# Patient Record
Sex: FEMALE | Race: BLACK OR AFRICAN AMERICAN | NOT HISPANIC OR LATINO | Employment: UNEMPLOYED | URBAN - METROPOLITAN AREA
[De-identification: names, ages, dates, MRNs, and addresses within clinical notes are randomized per-mention and may not be internally consistent; named-entity substitution may affect disease eponyms.]

---

## 2023-04-26 ENCOUNTER — HOSPITAL ENCOUNTER (EMERGENCY)
Facility: HOSPITAL | Age: 23
Discharge: HOME | End: 2023-04-26
Attending: EMERGENCY MEDICINE | Admitting: EMERGENCY MEDICINE

## 2023-04-26 VITALS
SYSTOLIC BLOOD PRESSURE: 158 MMHG | TEMPERATURE: 98.4 F | RESPIRATION RATE: 20 BRPM | DIASTOLIC BLOOD PRESSURE: 96 MMHG | HEART RATE: 92 BPM | OXYGEN SATURATION: 96 %

## 2023-04-26 DIAGNOSIS — J20.9 ACUTE BRONCHITIS, UNSPECIFIED ORGANISM: Primary | ICD-10-CM

## 2023-04-26 DIAGNOSIS — R09.81 CONGESTION OF NASAL SINUS: ICD-10-CM

## 2023-04-26 LAB
EXT PREGNANCY TEST URINE: NEGATIVE
EXT. CONTROL: NORMAL

## 2023-04-26 RX ORDER — OXYMETAZOLINE HYDROCHLORIDE 0.05 G/100ML
2 SPRAY NASAL ONCE
Status: COMPLETED | OUTPATIENT
Start: 2023-04-26 | End: 2023-04-26

## 2023-04-26 RX ORDER — ALBUTEROL SULFATE 2.5 MG/3ML
5 SOLUTION RESPIRATORY (INHALATION) ONCE
Status: COMPLETED | OUTPATIENT
Start: 2023-04-26 | End: 2023-04-26

## 2023-04-26 RX ORDER — ALBUTEROL SULFATE 90 UG/1
2 AEROSOL, METERED RESPIRATORY (INHALATION) EVERY 6 HOURS PRN
Qty: 18 G | Refills: 0 | Status: SHIPPED | OUTPATIENT
Start: 2023-04-26

## 2023-04-26 RX ORDER — GUAIFENESIN 600 MG/1
600 TABLET, EXTENDED RELEASE ORAL ONCE
Status: COMPLETED | OUTPATIENT
Start: 2023-04-26 | End: 2023-04-26

## 2023-04-26 RX ORDER — GUAIFENESIN 600 MG/1
1200 TABLET, EXTENDED RELEASE ORAL EVERY 12 HOURS SCHEDULED
Qty: 24 TABLET | Refills: 0 | Status: SHIPPED | OUTPATIENT
Start: 2023-04-26 | End: 2023-05-02

## 2023-04-26 RX ADMIN — IPRATROPIUM BROMIDE 0.5 MG: 0.5 SOLUTION RESPIRATORY (INHALATION) at 20:50

## 2023-04-26 RX ADMIN — ALBUTEROL SULFATE 5 MG: 2.5 SOLUTION RESPIRATORY (INHALATION) at 20:50

## 2023-04-26 RX ADMIN — GUAIFENESIN 600 MG: 600 TABLET, EXTENDED RELEASE ORAL at 21:14

## 2023-04-26 RX ADMIN — OXYMETAZOLINE HYDROCHLORIDE 2 SPRAY: 0.05 SPRAY NASAL at 21:14

## 2023-04-26 RX ADMIN — DEXAMETHASONE SODIUM PHOSPHATE 10 MG: 10 INJECTION, SOLUTION INTRAMUSCULAR; INTRAVENOUS at 20:50

## 2023-04-27 LAB
ATRIAL RATE: 99 BPM
P AXIS: 64 DEGREES
PR INTERVAL: 172 MS
QRS AXIS: 82 DEGREES
QRSD INTERVAL: 86 MS
QT INTERVAL: 368 MS
QTC INTERVAL: 472 MS
T WAVE AXIS: 44 DEGREES
VENTRICULAR RATE: 99 BPM

## 2023-04-27 NOTE — ED ATTENDING ATTESTATION
Final Diagnoses:     1  Acute bronchitis, unspecified organism    2  Congestion of nasal sinus           I, Marya Denver MD, saw and evaluated the patient  All available labs and X-rays were ordered by me or the resident and have been reviewed by myself  I discussed the patient with the resident / non-physician and agree with the resident's / non-physician practitioner's findings and plan as documented in the resident's / non-physician practicitioner's note, except where noted  At this point, I agree with the current assessment done in the ED  I was present during key portions of all procedures performed unless otherwise stated  Nursing Triage:     Chief Complaint   Patient presents with   • Shortness of Breath     Patient reports her and her daughter are sick  Patient reports SOB while at rest for the past 3 days  Patient reports cough that produces clear and green mucus  HPI:   This is a 20 y o  female presenting for evaluation of SOB  Viral symptoms for several days  Another child at home has similar congestion  Vape pen doesn't help SOB  No f/ch/n/v   +cough with green mucous  Her main complaint is actually the severe nasal congestion  Discussed smoking cessation  PMH: Asthma (exercise induced)    ASSESSMENT + PLAN:   Asthma:  Viral syndrome: Patient's story / exam fits with viral syndrome  - Afrin for severe nasal congestion    - Offered viral testing  - Discussed Tylenol/NSAIDs here + q6h at home  - Asthma management   - RTER precautions discussed orally  Physical:   General: VS reviewed  Appears in NAD  awake, alert  Well-nourished, well-developed  Appears stated age  Speaking normally in full sentences  Head: Normocephalic, atraumatic  Eyes: EOM-I  No diplopia  No hyphema  No subconjunctival hemorrhages  Symmetrical lids  ENT: Atraumatic external nose and ears  MMM  No malocclusion  No stridor  Normal phonation  No drooling  Normal swallowing     Neck: No JVD   CV: No pallor noted  Lungs:   +wheezing throughout  Mild tachypnea  No respiratory distress  Abd: soft nt nd no rebound/guarding  MSK:   FROM spontaneously  Skin: Dry, intact  Neuro: Awake, alert, GCS15, CN II-XII grossly intact  Motor grossly intact  Psychiatric/Behavioral: interacting normally; appropriate mood/affect   Exam: deferred    Vitals:    04/26/23 1956   BP: 158/96   BP Location: Right arm   Pulse: 92   Resp: 20   Temp: 98 4 °F (36 9 °C)   TempSrc: Oral   SpO2: 96%     - There are no obvious limitations to social determinants of care  - Nursing note reviewed  - Vitals reviewed  - Orders placed by myself and/or advanced practitioner / resident     - Previous chart was reviewed  - No language barrier    - History obtained from patient  - There are no limitations to the history obtained:     Past Medical:    has a past medical history of Exercise induced bronchospasm (3/6/2016) and Wears glasses  Past Surgical:    has a past surgical history that includes Appendectomy and Leggett tooth extraction  Social:     Social History     Substance and Sexual Activity   Alcohol Use Not Currently     Social History     Tobacco Use   Smoking Status Never   Smokeless Tobacco Never     Social History     Substance and Sexual Activity   Drug Use Yes   • Types: Marijuana       Echo:   No results found for this or any previous visit  No results found for this or any previous visit  Cath:    No results found for this or any previous visit        Code Status: No Order  Advance Directive and Living Will:      Power of :    POLST:    Medications   albuterol inhalation solution 5 mg (5 mg Nebulization Given 4/26/23 2050)   ipratropium (ATROVENT) 0 02 % inhalation solution 0 5 mg (0 5 mg Nebulization Given 4/26/23 2050)   dexamethasone oral liquid 10 mg 1 mL (10 mg Oral Given 4/26/23 2050)   oxymetazoline (AFRIN) 0 05 % nasal spray 2 spray (2 sprays Each Nare Given 4/26/23 2114) guaiFENesin (MUCINEX) 12 hr tablet 600 mg (600 mg Oral Given 4/26/23 2114)     No orders to display     Orders Placed This Encounter   Procedures   • POCT pregnancy, urine   • ECG 12 lead     Labs Reviewed   POCT PREGNANCY, URINE - Normal       Result Value Ref Range Status    EXT Preg Test, Ur Negative   Final    Control Valid   Final     Time reflects when diagnosis was documented in both MDM as applicable and the Disposition within this note     Time User Action Codes Description Comment    4/26/2023  9:08 PM Kenji VAZQUEZ Add [J20 9] Acute bronchitis, unspecified organism     4/26/2023  9:27 PM Kenji VAZQUEZ Add [R09 81] Congestion of nasal sinus     4/26/2023  9:27 PM Kenji VAZQUEZ Modify [J20 9] Acute bronchitis, unspecified organism     4/26/2023  9:27 PM [address] [R09 81] Congestion of nasal sinus       ED Disposition     ED Disposition   Discharge    Condition   Stable    Date/Time   Wed Apr 26, 2023  9:27 PM    Comment   Dash Montano discharge to home/self care                 Follow-up Information     Follow up With Specialties Details Why [address] [facility] Schedule an appointment as soon as possible for a visit   [address] [facility] [address] [facility] [address]  [facility] [address], [address] [facility] [address], [place], [place], [address]   [facility] Emergency Department Emergency Medicine Go to  If symptoms worsen [address] [phone]  [address] [facility] [address] Emergency Department, [address] [place] [address], [address]        Discharge Medication List as of 4/26/2023  9:30 PM      START taking these medications    Details   guaiFENesin (MUCINEX) 600 mg 12 hr tablet Take 2 tablets (1,200 mg total) by mouth every 12 (twelve) hours for 6 days, Starting BAKARI Black "4/26/2023, Until Tue 5/2/2023, Normal         CONTINUE these medications which have CHANGED    Details   albuterol (PROVENTIL HFA,VENTOLIN HFA) 90 mcg/act inhaler Inhale 2 puffs every 6 (six) hours as needed for shortness of breath, Starting Wed 4/26/2023, Normal         CONTINUE these medications which have NOT CHANGED    Details   methylPREDNISolone 4 MG tablet therapy pack Use as directed on package, Normal           No discharge procedures on file  Prior to Admission Medications   Prescriptions Last Dose Informant Patient Reported? Taking? albuterol (PROVENTIL HFA,VENTOLIN HFA) 90 mcg/act inhaler   No No   Sig: Inhale 2 puffs every 6 (six) hours as needed for shortness of breath   albuterol (PROVENTIL HFA,VENTOLIN HFA) 90 mcg/act inhaler   No Yes   Sig: Inhale 2 puffs every 6 (six) hours as needed for shortness of breath   methylPREDNISolone 4 MG tablet therapy pack   No No   Sig: Use as directed on package      Facility-Administered Medications: None                        Portions of the record may have been created with voice recognition software  Occasional wrong word or \"sound a like\" substitutions may have occurred due to the inherent limitations of voice recognition software  Read the chart carefully and recognize, using context, where substitutions have occurred      Electronically signed by:  Henna Blake    "

## 2023-04-27 NOTE — ED PROVIDER NOTES
History  Chief Complaint   Patient presents with   • Shortness of Breath     Patient reports her and her daughter are sick  Patient reports SOB while at rest for the past 3 days  Patient reports cough that produces clear and green mucus  20 y o  female presenting for evaluation of SOB  Viral symptoms for several days  Baby at home has similar congestion  Vape pen doesn't help SOB  No f/ch/n/v  Nasal congestion  +cough with green mucous  Her main complaint is actually the severe nasal congestion  Discussed smoking cessation  Prior to Admission Medications   Prescriptions Last Dose Informant Patient Reported? Taking? albuterol (PROVENTIL HFA,VENTOLIN HFA) 90 mcg/act inhaler   No No   Sig: Inhale 2 puffs every 6 (six) hours as needed for shortness of breath   albuterol (PROVENTIL HFA,VENTOLIN HFA) 90 mcg/act inhaler   No Yes   Sig: Inhale 2 puffs every 6 (six) hours as needed for shortness of breath   methylPREDNISolone 4 MG tablet therapy pack   No No   Sig: Use as directed on package      Facility-Administered Medications: None       Past Medical History:   Diagnosis Date   • Exercise induced bronchospasm 3/6/2016    Albuterol 2 puffs q 4 prn   • Wears glasses        Past Surgical History:   Procedure Laterality Date   • APPENDECTOMY     • WISDOM TOOTH EXTRACTION         Family History   Problem Relation Age of Onset   • No Known Problems Mother    • No Known Problems Father      I have reviewed and agree with the history as documented  E-Cigarette/Vaping     E-Cigarette/Vaping Substances   • Flavoring Yes      Social History     Tobacco Use   • Smoking status: Never   • Smokeless tobacco: Never   Substance Use Topics   • Alcohol use: Not Currently   • Drug use: Yes     Types: Marijuana        Review of Systems   HENT: Positive for congestion  Respiratory: Positive for cough, chest tightness and shortness of breath          Physical Exam  ED Triage Vitals [04/26/23 1956]   Temperature Pulse Respirations Blood Pressure SpO2   98 4 °F (36 9 °C) 92 20 158/96 96 %      Temp Source Heart Rate Source Patient Position - Orthostatic VS BP Location FiO2 (%)   Oral Monitor Lying Right arm --      Pain Score       --             Orthostatic Vital Signs  Vitals:    04/26/23 1956   BP: 158/96   Pulse: 92   Patient Position - Orthostatic VS: Lying       Physical Exam  Vitals and nursing note reviewed  Constitutional:       General: She is not in acute distress  Appearance: She is well-developed  HENT:      Head: Normocephalic and atraumatic  Eyes:      Conjunctiva/sclera: Conjunctivae normal    Cardiovascular:      Rate and Rhythm: Normal rate and regular rhythm  Heart sounds: No murmur heard  Pulmonary:      Effort: Pulmonary effort is normal  No respiratory distress  Breath sounds: Decreased breath sounds present  Abdominal:      Palpations: Abdomen is soft  Tenderness: There is no abdominal tenderness  Musculoskeletal:         General: No swelling  Cervical back: Neck supple  Skin:     General: Skin is warm and dry  Capillary Refill: Capillary refill takes less than 2 seconds  Neurological:      Mental Status: She is alert     Psychiatric:         Mood and Affect: Mood normal          ED Medications  Medications   albuterol inhalation solution 5 mg (5 mg Nebulization Given 4/26/23 2050)   ipratropium (ATROVENT) 0 02 % inhalation solution 0 5 mg (0 5 mg Nebulization Given 4/26/23 2050)   dexamethasone oral liquid 10 mg 1 mL (10 mg Oral Given 4/26/23 2050)   oxymetazoline (AFRIN) 0 05 % nasal spray 2 spray (2 sprays Each Nare Given 4/26/23 2114)   guaiFENesin (MUCINEX) 12 hr tablet 600 mg (600 mg Oral Given 4/26/23 2114)       Diagnostic Studies  Results Reviewed     Procedure Component Value Units Date/Time    POCT pregnancy, urine [[id number]]     Lab Status: No result                  No orders to display         Procedures  Procedures      ED Course Medical Decision Making  Physical consistent with viral URI  Will give a DuoNeb, give dexamethasone, Afrin and Mucinex  Sincerely doubt pneumonia or more insidious cause of patient's symptoms  Patient reassessed no longer with decreased breath sounds, feeling better, will check a urine pregnancy at patient's behest and discharge  Patient given follow-up with PCP/[facility], given return precautions should her symptoms worsen  Amount and/or Complexity of Data Reviewed  Labs: ordered  Risk  OTC drugs  Prescription drug management  Disposition  Final diagnoses:   Acute bronchitis, unspecified organism   Congestion of nasal sinus     Time reflects when diagnosis was documented in both MDM as applicable and the Disposition within this note     Time User Action Codes Description Comment    4/26/2023  9:08 PM Christina Bills B Add [J20 9] Acute bronchitis, unspecified organism     4/26/2023  9:27 PM Avelinoanna Bills B Add [R09 81] Congestion of nasal sinus     4/26/2023  9:27 PM Christina Bills B Modify [J20 9] Acute bronchitis, unspecified organism     4/26/2023  9:27 PM [place] [address] [place] [address] [R09 81] Congestion of nasal sinus       ED Disposition     ED Disposition   Discharge    Condition   Stable    Date/Time   Wed Apr 26, 2023  9:27 PM    Comment   Dagmar Bring discharge to home/self care                 Follow-up Information     Follow up With Specialties Details Why Contact Info Additional [facility] Internal Medicine Schedule an appointment as soon as possible for a visit   [address] [place] [address] 160 [facility] [address]  [facility] [address], [address] [place] [address], [place], [place], [place], [address] [address] [place] [facility] Emergency Department Emergency Medicine Go to  If symptoms worsen [address] Emergency Department, [address], [place], [address] [phone]          Patient's Medications   Discharge Prescriptions    GUAIFENESIN (MUCINEX) 600 MG 12 HR TABLET    Take 2 tablets (1,200 mg total) by mouth every 12 (twelve) hours for 6 days       Start Date: 4/26/2023 End Date: 5/2/2023       Order Dose: 1,200 mg       Quantity: 24 tablet    Refills: 0     No discharge procedures on file  PDMP Review     None           ED Provider  Attending physically available and evaluated Bayron Holley I managed the patient along with the ED Attending      Electronically Signed by         Maria Funes DO  04/26/23 7038

## 2023-06-21 ENCOUNTER — HOSPITAL ENCOUNTER (EMERGENCY)
Facility: HOSPITAL | Age: 23
Discharge: HOME | End: 2023-06-21
Attending: EMERGENCY MEDICINE | Admitting: EMERGENCY MEDICINE
Payer: COMMERCIAL

## 2023-06-21 VITALS
OXYGEN SATURATION: 97 % | HEART RATE: 97 BPM | SYSTOLIC BLOOD PRESSURE: 123 MMHG | DIASTOLIC BLOOD PRESSURE: 76 MMHG | RESPIRATION RATE: 18 BRPM | TEMPERATURE: 98.2 F

## 2023-06-21 DIAGNOSIS — J03.90 ACUTE TONSILLITIS: Primary | ICD-10-CM

## 2023-06-21 LAB — S PYO DNA THROAT QL NAA+PROBE: NOT DETECTED

## 2023-06-21 PROCEDURE — 87651 STREP A DNA AMP PROBE: CPT | Performed by: PHYSICIAN ASSISTANT

## 2023-06-21 RX ORDER — CEPHALEXIN 500 MG/1
500 CAPSULE ORAL EVERY 12 HOURS SCHEDULED
Qty: 14 CAPSULE | Refills: 0 | Status: SHIPPED | OUTPATIENT
Start: 2023-06-21 | End: 2023-06-28

## 2023-06-21 RX ADMIN — DEXAMETHASONE SODIUM PHOSPHATE 10 MG: 10 INJECTION, SOLUTION INTRAMUSCULAR; INTRAVENOUS at 08:18

## 2023-06-21 NOTE — Clinical Note
Satya Harrison was seen and treated in our emergency department on 6/21/2023  Diagnosis:     Donyelle    She may return on this date: If you have any questions or concerns, please don't hesitate to call        Ml Laurent MD    ______________________________           _______________          _______________  Hospital Representative                              Date                                Time

## 2023-06-21 NOTE — ED PROVIDER NOTES
History  Chief Complaint   Patient presents with   • Sore Throat     Started last night, feels tonsils are swollen     77-year-old female presents to emergency room for evaluation of sore throat  Onset yesterday  States her throat feels very dry and it is painful to swallow  States her tonsils are enlarged and her voice sounds congested due to this  Denies nasal congestion  Admits to bilateral ear pain described as a pressure  Denies difficulty breathing or cough  Denies fever  Denies nausea or vomiting  She does admit to sick contacts with her stepchild going to  and currently having a rash  Sore Throat  Associated symptoms: ear pain    Associated symptoms: no chest pain, no chills, no fever, no rash and no shortness of breath        Prior to Admission Medications   Prescriptions Last Dose Informant Patient Reported? Taking? albuterol (PROVENTIL HFA,VENTOLIN HFA) 90 mcg/act inhaler   No No   Sig: Inhale 2 puffs every 6 (six) hours as needed for shortness of breath   methylPREDNISolone 4 MG tablet therapy pack   No No   Sig: Use as directed on package      Facility-Administered Medications: None       Past Medical History:   Diagnosis Date   • Exercise induced bronchospasm 3/6/2016    Albuterol 2 puffs q 4 prn   • Wears glasses        Past Surgical History:   Procedure Laterality Date   • APPENDECTOMY     • WISDOM TOOTH EXTRACTION         Family History   Problem Relation Age of Onset   • No Known Problems Mother    • No Known Problems Father      I have reviewed and agree with the history as documented  E-Cigarette/Vaping     E-Cigarette/Vaping Substances   • Flavoring Yes      Social History     Tobacco Use   • Smoking status: Never   • Smokeless tobacco: Never   Substance Use Topics   • Alcohol use: Not Currently   • Drug use: Yes     Types: Marijuana       Review of Systems   Constitutional: Negative for chills and fever  HENT: Positive for ear pain and sore throat   Negative for congestion  Eyes: Negative for redness  Respiratory: Negative for shortness of breath and wheezing  Cardiovascular: Negative for chest pain  Gastrointestinal: Negative for nausea and vomiting  Musculoskeletal: Negative for joint swelling  Skin: Negative for rash  Physical Exam  Physical Exam  Vitals and nursing note reviewed  Constitutional:       Appearance: Normal appearance  She is well-developed  HENT:      Head: Atraumatic  Right Ear: Tympanic membrane and external ear normal       Left Ear: Tympanic membrane and external ear normal       Nose: Nose normal  No rhinorrhea  Mouth/Throat:      Mouth: Mucous membranes are moist       Pharynx: Uvula midline  Pharyngeal swelling and posterior oropharyngeal erythema present  No oropharyngeal exudate or uvula swelling  Tonsils: No tonsillar exudate  3+ on the right  3+ on the left  Eyes:      Conjunctiva/sclera: Conjunctivae normal    Cardiovascular:      Rate and Rhythm: Normal rate and regular rhythm  Heart sounds: Normal heart sounds  Pulmonary:      Effort: Pulmonary effort is normal       Breath sounds: Normal breath sounds  Musculoskeletal:      Cervical back: Neck supple  Lymphadenopathy:      Cervical: No cervical adenopathy  Skin:     General: Skin is warm and dry  Findings: No rash  Neurological:      Mental Status: She is alert and oriented to person, place, and time     Psychiatric:         Mood and Affect: Mood normal          Vital Signs  ED Triage Vitals [06/21/23 0741]   Temperature Pulse Respirations Blood Pressure SpO2   98 2 °F (36 8 °C) 97 18 123/76 97 %      Temp Source Heart Rate Source Patient Position - Orthostatic VS BP Location FiO2 (%)   Temporal Monitor Lying Right arm --      Pain Score       3           Vitals:    06/21/23 0741   BP: 123/76   Pulse: 97   Patient Position - Orthostatic VS: Lying         Visual Acuity      ED Medications  Medications   dexamethasone oral liquid 10 mg 1 mL (10 mg Oral Given 6/21/23 0818)       Diagnostic Studies  Results Reviewed     Procedure Component Value Units Date/Time    Strep A PCR [[id number]] Collected: 06/21/23 0820    Lab Status: In process Specimen: Throat Updated: 06/21/23 0824                 No orders to display              Procedures  Procedures         ED Course                               SBIRT 20yo+    Flowsheet Row Most Recent Value   Initial Alcohol Screen: US AUDIT-C     1  How often do you have a drink containing alcohol? 1 Filed at: 06/21/2023 0739   3b  FEMALE Any Age, or MALE 65+: How often do you have 4 or more drinks on one occassion? 0 Filed at: 06/21/2023 0739   Audit-C Score 1 Filed at: 06/21/2023 1158                    Medical Decision Making  Differential diagnosis includes but is not limited to: Acute tonsillitis, viral pharyngitis, URI, no physical exam findings of peritonsillar abscess at this time  Acute tonsillitis: acute illness or injury  Amount and/or Complexity of Data Reviewed  Labs: ordered  Risk  Prescription drug management  Disposition  Final diagnoses:   Acute tonsillitis     Time reflects when diagnosis was documented in both MDM as applicable and the Disposition within this note     Time User Action Codes Description Comment    6/21/2023  8:13 AM Brittani Alejandre Add [J03 90] Acute tonsillitis       ED Disposition     ED Disposition   Discharge    Condition   Stable    Date/Time   Wed Jun 21, 2023  8:13 AM    Comment   Maria E Aparicio discharge to home/self care                 Follow-up Information     Follow up With Specialties Details Why Contact Info Additional [facility] In [facility] days  [address] [phone]  [facility], [address] Emergency Department Emergency Medicine  If symptoms worsen [address] [phone]  [address] [facility] [address] Emergency Department, [address], [place], [place], [address]          Discharge Medication List as of 6/21/2023  8:26 AM      START taking these medications    Details   cephalexin (Keflex) 500 mg capsule Take 1 capsule (500 mg total) by mouth every 12 (twelve) hours for 7 days, Starting Wed 6/21/2023, Until Wed 6/28/2023, Normal         CONTINUE these medications which have NOT CHANGED    Details   albuterol (PROVENTIL HFA,VENTOLIN HFA) 90 mcg/act inhaler Inhale 2 puffs every 6 (six) hours as needed for shortness of breath, Starting Wed 4/26/2023, Normal      methylPREDNISolone 4 MG tablet therapy pack Use as directed on package, Normal             No discharge procedures on file      PDMP Review     None          ED Provider  Electronically Signed by           Alexis Mohan PA-C  06/21/23 3114

## 2023-06-23 ENCOUNTER — HOSPITAL ENCOUNTER (EMERGENCY)
Facility: HOSPITAL | Age: 23
Discharge: HOME | End: 2023-06-23
Attending: EMERGENCY MEDICINE
Payer: COMMERCIAL

## 2023-06-23 VITALS
HEIGHT: 68 IN | SYSTOLIC BLOOD PRESSURE: 130 MMHG | OXYGEN SATURATION: 97 % | DIASTOLIC BLOOD PRESSURE: 73 MMHG | RESPIRATION RATE: 18 BRPM | BODY MASS INDEX: 32.99 KG/M2 | TEMPERATURE: 97.8 F | HEART RATE: 74 BPM

## 2023-06-23 DIAGNOSIS — R03.0 ELEVATED BLOOD PRESSURE READING: ICD-10-CM

## 2023-06-23 DIAGNOSIS — R21 RASH: Primary | ICD-10-CM

## 2023-06-23 DIAGNOSIS — B08.4 HAND, FOOT AND MOUTH DISEASE: ICD-10-CM

## 2023-06-23 LAB
EXT PREGNANCY TEST URINE: NEGATIVE
EXT. CONTROL: NORMAL

## 2023-06-23 PROCEDURE — 96372 THER/PROPH/DIAG INJ SC/IM: CPT

## 2023-06-23 PROCEDURE — 99283 EMERGENCY DEPT VISIT LOW MDM: CPT

## 2023-06-23 PROCEDURE — 81025 URINE PREGNANCY TEST: CPT | Performed by: EMERGENCY MEDICINE

## 2023-06-23 RX ORDER — KETOROLAC TROMETHAMINE 30 MG/ML
15 INJECTION, SOLUTION INTRAMUSCULAR; INTRAVENOUS ONCE
Status: COMPLETED | OUTPATIENT
Start: 2023-06-23 | End: 2023-06-23

## 2023-06-23 RX ADMIN — KETOROLAC TROMETHAMINE 15 MG: 30 INJECTION, SOLUTION INTRAMUSCULAR; INTRAVENOUS at 08:32

## 2023-06-23 NOTE — ED PROVIDER NOTES
History  Chief Complaint   Patient presents with   • Rash     Pt states that she has a red rash on the palms of her hand and bottoms of her feet  Patient is a 19-year-old female, with a history significant for herpes and asthma, who presents to the ED today due to rash that began this morning  The rash is located on the palms and soles  There is associated tingling feeling at the location of this rash  There is no associated fever, dyspnea, chest pain, abdominal pain  Patient reports continued sore throat that is improving in course  Notably, patient was evaluated in the ED on 6/21, diagnosed with tonsillitis, and provided with Decadron and Keflex that patient began taking two days ago  Starting yesterday, child in the household also developed a similar appearing rash primarily on the palms and soles  Patient denies headache, new sexual partner/STI other than herpes, hiking, tick bite, IVDU history, prolonged insertion of tampon, oral ulcers or sores  Patient took Tylenol x2 pills to try and remit her symptoms  Patient is without other concerns at this time  Prior to Admission Medications   Prescriptions Last Dose Informant Patient Reported? Taking?    albuterol (PROVENTIL HFA,VENTOLIN HFA) 90 mcg/act inhaler   No No   Sig: Inhale 2 puffs every 6 (six) hours as needed for shortness of breath   cephalexin (Keflex) 500 mg capsule   No No   Sig: Take 1 capsule (500 mg total) by mouth every 12 (twelve) hours for 7 days   methylPREDNISolone 4 MG tablet therapy pack   No No   Sig: Use as directed on package      Facility-Administered Medications: None       Past Medical History:   Diagnosis Date   • Exercise induced bronchospasm 3/6/2016    Albuterol 2 puffs q 4 prn   • Wears glasses        Past Surgical History:   Procedure Laterality Date   • APPENDECTOMY     • WISDOM TOOTH EXTRACTION         Family History   Problem Relation Age of Onset   • No Known Problems Mother    • No Known Problems Father I have reviewed and agree with the history as documented  E-Cigarette/Vaping     E-Cigarette/Vaping Substances   • Flavoring Yes      Social History     Tobacco Use   • Smoking status: Never   • Smokeless tobacco: Never   Substance Use Topics   • Alcohol use: Not Currently   • Drug use: Yes     Types: Marijuana        Review of Systems   Constitutional: Negative for fever  HENT: Negative for trouble swallowing  Eyes: Negative for visual disturbance  Respiratory: Negative for shortness of breath  Cardiovascular: Negative for chest pain  Gastrointestinal: Negative for abdominal pain  Endocrine: Negative for polyuria  Genitourinary: Negative for dysuria  Musculoskeletal: Negative for gait problem  Skin: Positive for rash  Allergic/Immunologic: Positive for environmental allergies  Neurological: Negative for weakness, numbness and headaches  Hematological: Negative for adenopathy  Psychiatric/Behavioral: Negative for confusion  All other systems reviewed and are negative  Physical Exam  ED Triage Vitals [06/23/23 0737]   Temperature Pulse Respirations Blood Pressure SpO2   97 8 °F (36 6 °C) 74 18 130/73 97 %      Temp src Heart Rate Source Patient Position - Orthostatic VS BP Location FiO2 (%)   -- Monitor Sitting Left arm --      Pain Score       No Pain             Orthostatic Vital Signs  Vitals:    06/23/23 0737   BP: 130/73   Pulse: 74   Patient Position - Orthostatic VS: Sitting       Physical Exam  Vitals and nursing note reviewed  Constitutional:       General: She is not in acute distress  Appearance: She is not toxic-appearing or diaphoretic  Comments: Patient appears comfortable during my evaluation    HENT:      Head: Normocephalic and atraumatic  Right Ear: External ear normal       Left Ear: External ear normal       Nose: Nose normal  No rhinorrhea  Mouth/Throat:      Mouth: Mucous membranes are moist       Pharynx: Oropharynx is clear  Posterior oropharyngeal erythema present  No oropharyngeal exudate  Comments: Uvula midline  No oropharyngeal or submandibular mass/swelling  Eyes:      General: No scleral icterus  Right eye: No discharge  Left eye: No discharge  Conjunctiva/sclera: Conjunctivae normal       Pupils: Pupils are equal, round, and reactive to light  Neck:      Comments: Patient is spontaneously rotating their neck to the left and right during the history and physical exam interaction without difficulty or apparent discomfort    Cardiovascular:      Rate and Rhythm: Normal rate and regular rhythm  Pulses: Normal pulses  Heart sounds: Normal heart sounds  No murmur heard  No friction rub  No gallop  Comments: 2+ Radial  Pulmonary:      Effort: Pulmonary effort is normal  No respiratory distress  Breath sounds: Normal breath sounds  No stridor  No wheezing, rhonchi or rales  Abdominal:      General: Abdomen is flat  There is no distension  Palpations: Abdomen is soft  Tenderness: There is no abdominal tenderness  There is no right CVA tenderness, left CVA tenderness, guarding or rebound  Musculoskeletal:         General: No deformity  Cervical back: Neck supple  No tenderness  No muscular tenderness  Lymphadenopathy:      Cervical: No cervical adenopathy  Skin:     General: Skin is warm and dry  Capillary Refill: Capillary refill takes less than 2 seconds  Findings: Erythema and rash present  Comments: Blanchable, Nikolsky negative, macular erythema on the palms, soles, shins  Dots of erythema in the oropharynx    No oral ulcers or sores   Neurological:      Mental Status: She is alert  Comments: Patient is speaking clearly in complete sentences  Patient is answering appropriately and able follow commands  Patient is moving all four extremities spontaneously  No facial droop  Tongue midline        Psychiatric:         Mood and Affect: Mood normal          Behavior: Behavior normal          ED Medications  Medications   ketorolac (TORADOL) injection 15 mg (has no administration in time range)       Diagnostic Studies  Results Reviewed     Procedure Component Value Units Date/Time    POCT pregnancy, urine [[id number]]  (Normal) Resulted: 06/23/23 0822    Lab Status: Final result Updated: 06/23/23 0822     EXT Preg Test, Ur Negative     Control Valid                 No orders to display         Procedures  Procedures      ED Course  ED Course as of 06/23/23 0831 Fri Jun 23, 2023 0822 PREGNANCY TEST URINE: Negative  WNL   0826 Patient asked if she should talk to her child's pediatrician  Discussing with the pediatrician was recommended and patient was advised to bring her child in to the ED if she is concerned about their health  She was told we are open 24/7  Patient no further questions or concerns after receiving discharge instructions and return precautions                              SBIRT 20yo+    Flowsheet Row Most Recent Value   Initial Alcohol Screen: US AUDIT-C     1  How often do you have a drink containing alcohol? 0 Filed at: 06/23/2023 0740   2  How many drinks containing alcohol do you have on a typical day you are drinking? 0 Filed at: 06/23/2023 0740   3a  Male UNDER 65: How often do you have five or more drinks on one occasion? 0 Filed at: 06/23/2023 0740   3b  FEMALE Any Age, or MALE 65+: How often do you have 4 or more drinks on one occassion? 0 Filed at: 06/23/2023 0740   Audit-C Score 0 Filed at: 06/23/2023 0740   NUBIA: How many times in the past year have you    Used an illegal drug or used a prescription medication for non-medical reasons? Never Filed at: 06/23/2023 0740                Medical Decision Making  Patient is a 58-year-old female, with a history significant for herpes and asthma, who presents to the ED today due to rash that began this morning  The rash is located on the palms and soles    There is associated tingling feeling at the location of this rash  There is no associated fever, dyspnea, chest pain, abdominal pain  Patient reports continued sore throat that is improving in course  Notably, patient was evaluated in the ED on 6/21, diagnosed with tonsillitis, and provided with Decadron and Keflex that patient began taking two days ago  Starting yesterday, child in the household also developed a similar appearing rash primarily on the palms and soles  Patient denies headache, new sexual partner/STI other than herpes, hiking, tick bite, IVDU history, prolonged insertion of tampon, oral ulcers or sores  Patient is currently afebrile and hemodynamically stable  Her physical exam is notable for blanchable erythema on the palms, soles, anterior knee as well as erythema in the oropharynx  Heart lungs clear to auscultation, abdomen soft and nontender  This presentation is concerning for: Hand-foot-and-mouth disease  No reason to suspect meningitis, [facility] spotted fever, toxic shock syndrome, endocarditis at this time based upon history and physical exam   Will investigate with pregnancy test   Will manage based upon work-up  - No language barrier    - History obtained from patient  - There are no limitations to the history obtained  - External record review performed of previous charting was performed by me  Amount and/or Complexity of Data Reviewed  Labs: ordered  Decision-making details documented in ED Course  Risk  Prescription drug management              Disposition  Final diagnoses:   Rash   Hand, foot and mouth disease   Elevated blood pressure reading     Time reflects when diagnosis was documented in both MDM as applicable and the Disposition within this note     Time User Action Codes Description Comment    6/23/2023  8:09 AM Verlena Milder A Add [R21] Rash     6/23/2023  8:09 AM Marques Cardoza A Add [B08 4] Hand, foot and mouth disease     6/23/2023  8:09 AM Daril Lunch Add [R03 0] Elevated blood pressure reading       ED Disposition     ED Disposition   Discharge    Condition   Stable    Date/Time   Fri Jun 23, 2023  8:23 AM    Comment   Maria E Aparicio discharge to home/self care  Follow-up Information     Follow up With Specialties Details Why Contact Info Additional [address]  [facility] Schedule an appointment as soon as possible for a visit   [address] [phone]  [place] [phone], [address] [place] [address]          Patient's Medications   Discharge Prescriptions    No medications on file         PDMP Review     None           ED Provider  Attending physically available and evaluated Natalie Artur ARORA managed the patient along with the ED Attending      Electronically Signed by         Jena Bell MD  06/23/23 1831

## 2023-06-23 NOTE — DISCHARGE INSTRUCTIONS
You were evaluated in the emergency department for:   You can access your current and pending results through [facility]  A radiologist will take a second look at your X-Rays, if you had any, and you will be contacted with any new findings  You should follow-up with your primary care provider, as soon as possible, for re-evaluation  If you do not have a primary care provider, I have referred you to [address] [place] [address]  You will be contacted about scheduling an appointment  Their phone number is also included on this paperwork  You may take 650mg of tylenol every four to six hours, not exceeding 3,000mg daily, for the management of your discomfort  You may also take ibuprofen, 400mg every six to eight hours  Your workup revealed no emergent features at this time; however, many disease processes are dynamic:    Please, return to the emergency department if you experience new or worsening symptoms, fever, chest pain, shortness of breath, difficulty breathing, dizziness, abdominal pain, persistent nausea/vomiting, syncope or passing out, blood in your urine or stool, coughing up blood, leg swelling/pain, urinary retention, bowel or bladder incontinence, numbness between your legs  Additionally, your blood pressure was measured to be high  This is something that you should discuss with your primary care provider and have re-checked within one week

## 2023-06-23 NOTE — ED ATTENDING ATTESTATION
6/23/2023  IKrissy DO, saw and evaluated the patient  I have discussed the patient with the resident/non-physician practitioner and agree with the resident's/non-physician practitioner's findings, Plan of Care, and MDM as documented in the resident's/non-physician practitioner's note, except where noted  All available labs and Radiology studies were reviewed  I was present for key portions of any procedure(s) performed by the resident/non-physician practitioner and I was immediately available to provide assistance  At this point I agree with the current assessment done in the Emergency Department  I have conducted an independent evaluation of this patient a history and physical is as follows:    79-year-old female presents with rash on palms of her hands and bottoms of her feet  Patient states was seen here couple days ago for sore throat  No new sexual contacts, no hiking or tick bites  Daughter has similar rash  On exam-no acute distress, heart regular, no respiratory stress, appears nontoxic, erythematous macular rash noted on palms bilaterally, oropharynx is clear    Plan-suspect likely hand-foot-and-mouth disease, supportive care, return precautions    ED Course         Critical Care Time  Procedures

## 2024-02-13 ENCOUNTER — APPOINTMENT (EMERGENCY)
Dept: RADIOLOGY | Facility: HOSPITAL | Age: 24
End: 2024-02-13
Payer: COMMERCIAL

## 2024-02-13 ENCOUNTER — HOSPITAL ENCOUNTER (EMERGENCY)
Facility: HOSPITAL | Age: 24
Discharge: HOME | End: 2024-02-13
Attending: EMERGENCY MEDICINE
Payer: COMMERCIAL

## 2024-02-13 VITALS
OXYGEN SATURATION: 97 % | DIASTOLIC BLOOD PRESSURE: 97 MMHG | SYSTOLIC BLOOD PRESSURE: 131 MMHG | HEART RATE: 77 BPM | TEMPERATURE: 98 F | RESPIRATION RATE: 18 BRPM

## 2024-02-13 DIAGNOSIS — S93.402A LEFT ANKLE SPRAIN: Primary | ICD-10-CM

## 2024-02-13 PROCEDURE — 73610 X-RAY EXAM OF ANKLE: CPT

## 2024-02-13 PROCEDURE — 99283 EMERGENCY DEPT VISIT LOW MDM: CPT

## 2024-02-13 RX ORDER — ACETAMINOPHEN 325 MG/1
650 TABLET ORAL ONCE
Status: COMPLETED | OUTPATIENT
Start: 2024-02-13 | End: 2024-02-13

## 2024-02-13 RX ORDER — IBUPROFEN 400 MG/1
800 TABLET ORAL ONCE
Status: COMPLETED | OUTPATIENT
Start: 2024-02-13 | End: 2024-02-13

## 2024-02-13 RX ADMIN — ACETAMINOPHEN 650 MG: 325 TABLET, FILM COATED ORAL at 15:58

## 2024-02-13 RX ADMIN — IBUPROFEN 800 MG: 400 TABLET, FILM COATED ORAL at 15:58

## 2024-02-13 NOTE — ED ATTENDING ATTESTATION
2/13/2024  IAlessia MD, saw and evaluated the patient. I have discussed the patient with the resident/non-physician practitioner and agree with the resident's/non-physician practitioner's findings, Plan of Care, and MDM as documented in the resident's/non-physician practitioner's note, except where noted. All available labs and Radiology studies were reviewed.  I was present for key portions of any procedure(s) performed by the resident/non-physician practitioner and I was immediately available to provide assistance.       At this point I agree with the current assessment done in the Emergency Department.  I have conducted an independent evaluation of this patient a history and physical is as follows:    23-year-old female presenting for evaluation of ankle pain.  Patient states she slipped and fell in the grocery store, twisting her left foot and ankle.  States she felt pain diffusely through her anterior left ankle but is able to ambulate.  Did not strike her head or lose consciousness.  Denies paresthesias.  Denies other acute complaints.    Please see resident documentation for histories and review of systems.    Exam: Vital signs and nursing notes reviewed  General: Awake, alert, no acute distress  HEENT: Normocephalic, atraumatic, mucous membranes moist  Neck: Supple  Heart: Regular rate and rhythm  Lungs: Nonlabored respirations  Abdomen: Nondistended  Extremities: No swelling or deformity to left ankle.  No anterior tibial or proximal fibular head tenderness.  Tenderness over the anterior syndesmosis and bilateral malleoli, both anterior and posterior, good distal pulses and capillary refill in the left lower extremity.  No tenderness to the base of the first or fifth metatarsal.  Skin: Warm, dry, intact, no rash  Neuro: Intact plantarflexion and dorsiflexion of the left ankle without restriction    ED Course  ED Course as of 02/13/24 1704   Tue Feb 13, 2024   1612 XR ankle 3+ views  LEFT  IMPRESSION:     No acute osseous abnormality.       ED course/medical decision m[age]-year-old female presenting for evaluation of left ankle pain.  Differential diagnosis includes acute fracture, dislocation, ligamentous injury, sprain, contusion.  X-ray obtained of the left ankle, which as per my interpretation, is negative for acute osseous abnormality.  Radiology interpretation agrees.  Patient placed in an Aircast for suspected ankle sprain.  Provided with crutches to assist with ambulation.  Will follow-up with orthopedic surgery and physical therapy.  Supportive measures at home including analgesia and range of motion exercises discussed.  Return precautions discussed.  Patient is in agreement and understanding of these instructions.    Diagnosis: Left ankle sprain  Disposition: Discharge

## 2024-02-13 NOTE — ED PROVIDER NOTES
History  Chief Complaint   Patient presents with    Ankle Injury     Patient was at [facility] inside and floors were wet. Patient reports not seeing wet floor signs. Patient slipped on wet floor and rolled her ankle. Patient reports pain when ambulating      23-year-old female with unremarkable past medical history presents for evaluation of left lateral ankle pain after inverting the ankle after accidentally slipping on a wet floor at [facility] across the street just PTA.  Patient states she did not fall down completely and denies head strike or LOC.  Reports that she was able to bear weight on the left foot however with some difficulty.  Denies any other injuries.  No other concerns.        Prior to Admission Medications   Prescriptions Last Dose Informant Patient Reported? Taking?   albuterol (PROVENTIL HFA,VENTOLIN HFA) 90 mcg/act inhaler   No No   Sig: Inhale 2 puffs every 6 (six) hours as needed for shortness of breath   methylPREDNISolone 4 MG tablet therapy pack   No No   Sig: Use as directed on package      Facility-Administered Medications: None       Past Medical History:   Diagnosis Date    Exercise induced bronchospasm 3/6/2016    Albuterol 2 puffs q 4 prn    Wears glasses        Past Surgical History:   Procedure Laterality Date    APPENDECTOMY      WISDOM TOOTH EXTRACTION         Family History   Problem Relation Age of Onset    No Known Problems Mother     No Known Problems Father      I have reviewed and agree with the history as documented.    E-Cigarette/Vaping     E-Cigarette/Vaping Substances    Flavoring Yes      Social History     Tobacco Use    Smoking status: Never    Smokeless tobacco: Never   Substance Use Topics    Alcohol use: Not Currently    Drug use: Yes     Types: Marijuana        Review of Systems   Constitutional:  Negative for chills and fever.   HENT:  Negative for ear pain and sore throat.    Eyes:  Negative for pain and visual disturbance.   Respiratory:  Negative for cough and  shortness of breath.    Cardiovascular:  Negative for chest pain and palpitations.   Gastrointestinal:  Negative for abdominal pain and vomiting.   Genitourinary:  Negative for dysuria and hematuria.   Musculoskeletal:  Positive for arthralgias. Negative for back pain.   Skin:  Negative for color change and rash.   Neurological:  Negative for seizures and syncope.   All other systems reviewed and are negative.      Physical Exam  ED Triage Vitals   Temperature Pulse Respirations Blood Pressure SpO2   02/13/24 1534 02/13/24 1534 02/13/24 1534 02/13/24 1534 02/13/24 1534   98 °F (36.7 °C) 77 18 131/97 97 %      Temp Source Heart Rate Source Patient Position - Orthostatic VS BP Location FiO2 (%)   02/13/24 1534 02/13/24 1534 02/13/24 1534 02/13/24 1534 --   Oral Monitor Sitting Left arm       Pain Score       02/13/24 1558       8             Orthostatic Vital Signs  Vitals:    02/13/24 1534   BP: 131/97   Pulse: 77   Patient Position - Orthostatic VS: Sitting       Physical Exam  Vitals and nursing note reviewed.   Constitutional:       General: She is not in acute distress.     Appearance: Normal appearance. She is well-developed. She is not ill-appearing, toxic-appearing or diaphoretic.   HENT:      Head: Normocephalic and atraumatic.      Right Ear: External ear normal.      Left Ear: External ear normal.      Nose: Nose normal.      Mouth/Throat:      Mouth: Mucous membranes are moist.   Eyes:      Extraocular Movements: Extraocular movements intact.      Conjunctiva/sclera: Conjunctivae normal.   Pulmonary:      Effort: Pulmonary effort is normal. No respiratory distress.   Musculoskeletal:         General: Swelling and tenderness present.      Cervical back: Normal range of motion.      Comments: Minimal swelling and ecchymosis over the left lateral malleolus with point tenderness.  Tenderness with inversion.  Strength intact and range of motion otherwise intact.  Soft compartments.  Neurovascularly intact.  No  point tenderness throughout the foot and tibia/fibula throughout.   Skin:     General: Skin is warm and dry.      Capillary Refill: Capillary refill takes less than 2 seconds.   Neurological:      Mental Status: She is alert.   Psychiatric:         Mood and Affect: Mood normal.         ED Medications  Medications   acetaminophen (TYLENOL) tablet 650 mg (650 mg Oral Given 2/13/24 1558)   ibuprofen (MOTRIN) tablet 800 mg (800 mg Oral Given 2/13/24 1558)       Diagnostic Studies  Results Reviewed       None                   XR ankle 3+ views LEFT   Final Result by Mike Palmer MD (02/13 1607)      No acute osseous abnormality.            Workstation performed: [id number]               Procedures  Procedures      ED Course                             SBIRT 20yo+      Flowsheet Row Most Recent Value   Initial Alcohol Screen: US AUDIT-C     1. How often do you have a drink containing alcohol? 0 Filed at: 02/13/2024 1536   2. How many drinks containing alcohol do you have on a typical day you are drinking?  0 Filed at: 02/13/2024 1536   3a. Male UNDER 65: How often do you have five or more drinks on one occasion? 0 Filed at: 02/13/2024 1536   3b. FEMALE Any Age, or MALE 65+: How often do you have 4 or more drinks on one occassion? 0 Filed at: 02/13/2024 1536   Audit-C Score 0 Filed at: 02/13/2024 1536   NUBIA: How many times in the past year have you...    Used an illegal drug or used a prescription medication for non-medical reasons? Never Filed at: 02/13/2024 1536                  Medical Decision Making  Patient remained stable throughout ED course.  No evidence of acute osseous abnormality on x-ray of left ankle.  Given appearance of ankle with report of inversion PTA, Dx consistent with grade 1 ankle sprain for which patient was provided crutches and an aircast for immobilization.  Patient was also provided Tylenol and ibuprofen for symptomatic management as well as ice for edema.  Follow-up information to orthopedic  surgery and physical therapy provided to be used on an as-needed basis.  Return to ER precautions discussed.  Stable for discharge home. Pt understands and agreed with plan. All questions answered. No other concerns.        Amount and/or Complexity of Data Reviewed  Radiology: independent interpretation performed.    Risk  OTC drugs.  Prescription drug management.          Disposition  Final diagnoses:   Left ankle sprain     Time reflects when diagnosis was documented in both MDM as applicable and the Disposition within this note       Time User Action Codes Description Comment    2/13/2024  3:46 PM Patricia Osorio Add [S93.402A] Left ankle sprain           ED Disposition       ED Disposition   Discharge    Condition   Stable    Date/Time   Tue Feb 13, 2024 1609    Comment   Donyelle Petties discharge to home/self care.                   Follow-up Information       Follow up With Specialties Details Why Contact Info Additional Information    [facility] Emergency Medicine Go to  As needed, If symptoms worsen [address] [facility] [address]  [phone] [facility], [address] [place], [place], [address]    [facility] Orthopedic [facility] Orthopedic Surgery Call  As needed [address] [facility]  [address]  [facility] [id number] [phone] [facility] Orthopedic Care [facility], [facility] [address], [address] [facility], [place], [id number] [phone]    Physical Therapy at [address] Physical Therapy Schedule an appointment as soon as possible for a visit  As needed [address] [place] [address]  [phone] Physical Therapy at [address], [address] [place] [address], [id number] [phone]            Discharge Medication List as of 2/13/2024  4:09 PM        CONTINUE these medications which have NOT CHANGED    Details    albuterol (PROVENTIL HFA,VENTOLIN HFA) 90 mcg/act inhaler Inhale 2 puffs every 6 (six) hours as needed for shortness of breath, Starting Wed 4/26/2023, Normal      methylPREDNISolone 4 MG tablet therapy pack Use as directed on package, Normal           No discharge procedures on file.    PDMP Review       None             ED Provider  Attending physically available and evaluated Maria E Perez. I managed the patient along with the ED Attending.    Electronically Signed by           Patricia Osorio MD  02/13/24 8311

## 2024-02-13 NOTE — DISCHARGE INSTRUCTIONS
Please take Tylenol for pain every 4 hours as needed not to exceed 4000 mg or 4 g per day. For example you may take 500 mg every 4 hours or 1000 mg every 8 hours for pain.    Please take Ibuprofen as needed for pain, up to 3.2 g per day. For example you may take 600-800 mg every 6 hours alternating with Tylenol as needed. Please take with food and no more than for 3 days continuously.

## 2024-05-13 ENCOUNTER — HOSPITAL ENCOUNTER (EMERGENCY)
Facility: HOSPITAL | Age: 24
Discharge: HOME | End: 2024-05-14
Attending: EMERGENCY MEDICINE
Payer: COMMERCIAL

## 2024-05-13 VITALS
TEMPERATURE: 98 F | RESPIRATION RATE: 20 BRPM | HEART RATE: 103 BPM | OXYGEN SATURATION: 98 % | DIASTOLIC BLOOD PRESSURE: 84 MMHG | SYSTOLIC BLOOD PRESSURE: 107 MMHG

## 2024-05-13 DIAGNOSIS — T18.108A FOREIGN BODY IN ESOPHAGUS, INITIAL ENCOUNTER: Primary | ICD-10-CM

## 2024-05-13 PROCEDURE — 96374 THER/PROPH/DIAG INJ IV PUSH: CPT

## 2024-05-13 PROCEDURE — 99283 EMERGENCY DEPT VISIT LOW MDM: CPT

## 2024-05-13 PROCEDURE — 99284 EMERGENCY DEPT VISIT MOD MDM: CPT | Performed by: EMERGENCY MEDICINE

## 2024-05-13 RX ORDER — NITROGLYCERIN 0.4 MG/1
0.4 TABLET SUBLINGUAL ONCE
Status: COMPLETED | OUTPATIENT
Start: 2024-05-13 | End: 2024-05-13

## 2024-05-13 RX ADMIN — GLUCAGON 1 MG: 1 INJECTION, POWDER, LYOPHILIZED, FOR SOLUTION INTRAMUSCULAR; INTRAVENOUS at 23:33

## 2024-05-13 RX ADMIN — NITROGLYCERIN 0.4 MG: 0.4 TABLET SUBLINGUAL at 23:55

## 2024-05-14 PROCEDURE — 96376 TX/PRO/DX INJ SAME DRUG ADON: CPT

## 2024-05-14 RX ADMIN — GLUCAGON 1 MG: 1 INJECTION, POWDER, LYOPHILIZED, FOR SOLUTION INTRAMUSCULAR; INTRAVENOUS at 00:21

## 2024-05-14 NOTE — ED PROVIDER NOTES
History  Chief Complaint   Patient presents with    Swallowed Foreign Body     Reports eating meat for dinner and stating it is stuck in her throat. [facility]      HPI    Maria E Perez is a 23 y.o. female presenting with CC food stuck in her throat. Patient states she was eating steak about one hour ago when this happened. She has been unable to swallow her secretions or drink water since. She is able to breathe without stridor.     Prior to Admission Medications   Prescriptions Last Dose Informant Patient Reported? Taking?   albuterol (PROVENTIL HFA,VENTOLIN HFA) 90 mcg/act inhaler   No No   Sig: Inhale 2 puffs every 6 (six) hours as needed for shortness of breath   methylPREDNISolone 4 MG tablet therapy pack   No No   Sig: Use as directed on package      Facility-Administered Medications: None       Past Medical History:   Diagnosis Date    Exercise induced bronchospasm 3/6/2016    Albuterol 2 puffs q 4 prn    Wears glasses        Past Surgical History:   Procedure Laterality Date    APPENDECTOMY      WISDOM TOOTH EXTRACTION         Family History   Problem Relation Age of Onset    No Known Problems Mother     No Known Problems Father      I have reviewed and agree with the history as documented.    E-Cigarette/Vaping     E-Cigarette/Vaping Substances    Flavoring Yes      Social History     Tobacco Use    Smoking status: Never    Smokeless tobacco: Never   Substance Use Topics    Alcohol use: Not Currently    Drug use: Yes     Types: Marijuana        Review of Systems   Constitutional:  Negative for activity change, appetite change, diaphoresis, fatigue and fever.   Respiratory:  Positive for chest tightness and shortness of breath. Negative for apnea and choking.    Cardiovascular:  Negative for chest pain.   Gastrointestinal:  Negative for abdominal distention, nausea and rectal pain.   Neurological:  Negative for dizziness and syncope.       Physical Exam  ED Triage Vitals   Temperature Pulse  Respirations Blood Pressure SpO2   05/13/24 2306 05/13/24 2306 05/13/24 2306 05/13/24 2307 05/13/24 2306   98 °F (36.7 °C) 103 20 107/84 98 %      Temp Source Heart Rate Source Patient Position - Orthostatic VS BP Location FiO2 (%)   05/13/24 2306 05/13/24 2306 05/13/24 2306 05/13/24 2306 --   Tympanic Monitor Lying Left arm       Pain Score       --                    Orthostatic Vital Signs  Vitals:    05/13/24 2306 05/13/24 2307   BP:  107/84   Pulse: 103    Patient Position - Orthostatic VS: Lying        Physical Exam  Constitutional:       General: She is in acute distress.      Appearance: Normal appearance.   HENT:      Head: Normocephalic and atraumatic.      Nose: Nose normal.      Mouth/Throat:      Mouth: Mucous membranes are moist.      Comments: Unable to swallow secretions or liquids.  Frequently dry heaving or gagging  Eyes:      Pupils: Pupils are equal, round, and reactive to light.   Neck:      Comments: Complaining of pain and midline neck due to foreign body  Cardiovascular:      Pulses: Normal pulses.   Pulmonary:      Effort: Pulmonary effort is normal.      Breath sounds: No stridor. No wheezing.   Abdominal:      General: Abdomen is flat.   Musculoskeletal:      Cervical back: No rigidity or tenderness.   Skin:     General: Skin is warm and dry.   Neurological:      General: No focal deficit present.      Mental Status: She is alert and oriented to person, place, and time.         ED Medications  Medications   glucagon (GLUCAGEN) injection 1 mg (1 mg Intravenous Given 5/13/24 2333)   nitroglycerin (NITROSTAT) SL tablet 0.4 mg (0.4 mg Sublingual Given 5/13/24 2355)   glucagon (GLUCAGEN) injection 1 mg (1 mg Intravenous Given 5/14/24 0021)       Diagnostic Studies  Results Reviewed       None                   No orders to display         Procedures  Procedures      ED Course  ED Course as of 05/15/24 0242   Tue May 14, 2024   0016 No relief after glucagon 1mg x1 and 0.4 PO nitro; second  glucagon ordered                              SBIRT 22yo+      Flowsheet Row Most Recent Value   Initial Alcohol Screen: US AUDIT-C     1. How often do you have a drink containing alcohol? 0 Filed at: 05/14/2024 0135   2. How many drinks containing alcohol do you have on a typical day you are drinking?  0 Filed at: 05/14/2024 0135   3b. FEMALE Any Age, or MALE 65+: How often do you have 4 or more drinks on one occassion? 0 Filed at: 05/14/2024 0135   Audit-C Score 0 Filed at: 05/14/2024 0135   NUBIA: How many times in the past year have you...    Used an illegal drug or used a prescription medication for non-medical reasons? Never Filed at: 05/14/2024 0135                  Medical Decision Making  Risk  Prescription drug management.      Patient is a 76 y.o. female  who presents to the ED with foreign body stuck in esophagus.    Vital signs stable, tachycardic. Exam as listed above    Differential diagnosis includes but is not limited to globus sensation versus food bolus stuck in esophagus.  Patient unable to fill secretions or liquids, do believe there is a food bolus stuck.  Plan IV glucagon, p.o. nitro.  If medical management fails, will consult GI.    View ED course above for further discussion on patient workup.     All labs reviewed and utilized in the medical decision making process  All radiology studies independently viewed by me and interpreted by the radiologist.  I reviewed all testing with the patient.     Upon re-evaluation patient is able to vomit up the food bolus after second dose of IV glucagon.  Shortness of breath.  Patient discharged home.      Disposition  Final diagnoses:   Foreign body in esophagus, initial encounter     Time reflects when diagnosis was documented in both MDM as applicable and the Disposition within this note       Time User Action Codes Description Comment    5/14/2024 12:46 AM Lynne Rose Add [T18.108A] Foreign body in esophagus, initial encounter           ED Disposition        ED Disposition   Discharge    Condition   Stable    Date/Time   Tue May 14, 2024 0046    Comment   Maria E Perez discharge to home/self care.                   Follow-up Information    None         Discharge Medication List as of 5/14/2024 12:46 AM        CONTINUE these medications which have NOT CHANGED    Details   albuterol (PROVENTIL HFA,VENTOLIN HFA) 90 mcg/act inhaler Inhale 2 puffs every 6 (six) hours as needed for shortness of breath, Starting Wed 4/26/2023, Normal      methylPREDNISolone 4 MG tablet therapy pack Use as directed on package, Normal           No discharge procedures on file.    PDMP Review       None             ED Provider  Attending physically available and evaluated Mraia E Perez. I managed the patient along with the ED Attending.    Electronically Signed by           Lynne Rose MD  05/15/24 3857

## 2024-05-14 NOTE — ED ATTENDING ATTESTATION
5/13/2024  I, Damion aHn MD, saw and evaluated the patient. I have discussed the patient with the resident/non-physician practitioner and agree with the resident's/non-physician practitioner's findings, Plan of Care, and MDM as documented in the resident's/non-physician practitioner's note, except where noted. All available labs and Radiology studies were reviewed.  I was present for key portions of any procedure(s) performed by the resident/non-physician practitioner and I was immediately available to provide assistance.       At this point I agree with the current assessment done in the Emergency Department.  I have conducted an independent evaluation of this patient a history and physical is as follows:    23-year-old female presents to the emergency department for possible food impaction.  Patient states she excellently swallowed steak prior to chewing it completely and feels as though it is stuck in her throat.  This happened approximately 1 hour ago.  She has been unable to tolerate any p.o. since.  Is able to breathe without difficulty.    On exam, patient uncomfortable appearing, head is normocephalic atraumatic, pupils equal round reactive, neck is supple without meningismus signs, heart is regular in rhythm with tight distal pulses, no increased work of breathing, respiratory distress, or stridor.    Differential diagnosis includes but is not limited to Food impaction, globus sensation, will trial glucagon and reassess.      No improvement following glucagon, will trial nitroglycerin.  No improvement with nitroglycerin, gave additional dose of glucagon with improvement of symptoms.  Patient now able to tolerate p.o.  Stable for discharge.    ED Course         Critical Care Time  Procedures

## 2024-06-20 ENCOUNTER — HOSPITAL ENCOUNTER (EMERGENCY)
Facility: HOSPITAL | Age: 24
Discharge: HOME | End: 2024-06-21
Attending: EMERGENCY MEDICINE | Admitting: EMERGENCY MEDICINE
Payer: COMMERCIAL

## 2024-06-20 VITALS
DIASTOLIC BLOOD PRESSURE: 86 MMHG | SYSTOLIC BLOOD PRESSURE: 120 MMHG | OXYGEN SATURATION: 97 % | HEART RATE: 84 BPM | TEMPERATURE: 98.4 F | RESPIRATION RATE: 18 BRPM

## 2024-06-20 DIAGNOSIS — S61.216A LACERATION OF RIGHT LITTLE FINGER WITHOUT FOREIGN BODY WITHOUT DAMAGE TO NAIL, INITIAL ENCOUNTER: Primary | ICD-10-CM

## 2024-06-20 PROCEDURE — 99282 EMERGENCY DEPT VISIT SF MDM: CPT

## 2024-06-20 NOTE — Clinical Note
accompanied Maria E Petties to the emergency department on 6/20/2024.    Return date if applicable:         If you have any questions or concerns, please don't hesitate to call.      Teddy Nixon MD

## 2024-06-20 NOTE — Clinical Note
Jordi Aparicio accompanied Donyelle Petties to the emergency department on 6/20/2024.    Return date if applicable: 06/22/2024        If you have any questions or concerns, please don't hesitate to call.      Avelino Shanks MD

## 2024-06-21 PROCEDURE — 99284 EMERGENCY DEPT VISIT MOD MDM: CPT | Performed by: EMERGENCY MEDICINE

## 2024-06-21 PROCEDURE — 12001 RPR S/N/AX/GEN/TRNK 2.5CM/<: CPT | Performed by: EMERGENCY MEDICINE

## 2024-06-21 NOTE — ED PROVIDER NOTES
History  Chief Complaint   Patient presents with    Hand Laceration     Pt cut the side of pinky finger on glass while washing dishes      HPI  Patient is 23-year-old female presenting for right lateral fifth finger laceration that occurred while patient was washing dishes.  Caught on a piece of glass.  Bleeding well-controlled at this time, right pinky neurovascular intact.  Patient is on any blood thinners.  Up-to-date on tetanus per chart review.  Prior to Admission Medications   Prescriptions Last Dose Informant Patient Reported? Taking?   albuterol (PROVENTIL HFA,VENTOLIN HFA) 90 mcg/act inhaler   No No   Sig: Inhale 2 puffs every 6 (six) hours as needed for shortness of breath   methylPREDNISolone 4 MG tablet therapy pack   No No   Sig: Use as directed on package      Facility-Administered Medications: None       Past Medical History:   Diagnosis Date    Exercise induced bronchospasm 3/6/2016    Albuterol 2 puffs q 4 prn    Wears glasses        Past Surgical History:   Procedure Laterality Date    APPENDECTOMY      WISDOM TOOTH EXTRACTION         Family History   Problem Relation Age of Onset    No Known Problems Mother     No Known Problems Father      I have reviewed and agree with the history as documented.    E-Cigarette/Vaping     E-Cigarette/Vaping Substances    Flavoring Yes      Social History     Tobacco Use    Smoking status: Never    Smokeless tobacco: Never   Substance Use Topics    Alcohol use: Not Currently    Drug use: Yes     Types: Marijuana        Review of Systems   Constitutional: Negative.    HENT: Negative.     Eyes: Negative.    Respiratory: Negative.     Cardiovascular: Negative.    Gastrointestinal: Negative.    Endocrine: Negative.    Genitourinary: Negative.    Musculoskeletal: Negative.    Skin:         Right pinky laceration   Allergic/Immunologic: Negative.    Neurological: Negative.    Hematological: Negative.    Psychiatric/Behavioral: Negative.     All other systems reviewed  and are negative.      Physical Exam  ED Triage Vitals   Temperature Pulse Respirations Blood Pressure SpO2   06/20/24 2249 06/20/24 2248 06/20/24 2248 06/20/24 2249 06/20/24 2248   98.4 °F (36.9 °C) 84 18 120/86 98 %      Temp src Heart Rate Source Patient Position - Orthostatic VS BP Location FiO2 (%)   -- 06/20/24 2248 -- -- --    Monitor         Pain Score       06/20/24 2249       2             Orthostatic Vital Signs  Vitals:    06/20/24 2248 06/20/24 2249   BP:  120/86   Pulse: 84        Physical Exam  Vitals and nursing note reviewed.   Constitutional:       Appearance: Normal appearance. She is normal weight.   HENT:      Head: Normocephalic and atraumatic.      Right Ear: Tympanic membrane, ear canal and external ear normal.      Left Ear: Tympanic membrane, ear canal and external ear normal.      Nose: Nose normal.      Mouth/Throat:      Mouth: Mucous membranes are moist.      Pharynx: Oropharynx is clear.   Eyes:      Extraocular Movements: Extraocular movements intact.      Conjunctiva/sclera: Conjunctivae normal.      Pupils: Pupils are equal, round, and reactive to light.   Cardiovascular:      Rate and Rhythm: Normal rate and regular rhythm.      Pulses: Normal pulses.      Heart sounds: Normal heart sounds.   Pulmonary:      Effort: Pulmonary effort is normal.      Breath sounds: Normal breath sounds.   Abdominal:      General: Abdomen is flat. Bowel sounds are normal.      Palpations: Abdomen is soft.   Musculoskeletal:         General: Normal range of motion.      Cervical back: Normal range of motion and neck supple.   Skin:     General: Skin is warm and dry.      Capillary Refill: Capillary refill takes less than 2 seconds.      Comments: Patient has 2 small lacerations on the lateral aspect of her right pinky, bleeding well-controlled.  Both lacerations are approximately half a centimeter.  Well-approximated.  Superficial.  Right pinky neurovascular intact.  Full range of motion of her right  "pinky.  No signs of tendinous involvement or ligamentous involvement.  No signs of vascular injury.   Neurological:      General: No focal deficit present.      Mental Status: She is alert and oriented to person, place, and time.   Psychiatric:         Mood and Affect: Mood normal.         Behavior: Behavior normal.         Thought Content: Thought content normal.         Judgment: Judgment normal.         ED Medications  Medications - No data to display    Diagnostic Studies  Results Reviewed       None                   No orders to display         Procedures  Procedures      ED Course         CRAFFT      Flowsheet Row Most Recent Value   CRAFFT Initial Screen: During the past 12 months, did you:    1. Drink any alcohol (more than a few sips)?  No Filed at: 06/20/2024 2341   2. Smoke any marijuana or hashish No Filed at: 06/20/2024 2341   3. Use anything else to get high? (\"anything else\" includes illegal drugs, over the counter and prescription drugs, and things that you sniff or 'villanueva')? No Filed at: 06/20/2024 2341                            SBIRT 20yo+      Flowsheet Row Most Recent Value   Initial Alcohol Screen: US AUDIT-C     1. How often do you have a drink containing alcohol? 0 Filed at: 06/20/2024 2341   2. How many drinks containing alcohol do you have on a typical day you are drinking?  0 Filed at: 06/20/2024 2341   3a. Male UNDER 65: How often do you have five or more drinks on one occasion? 0 Filed at: 06/20/2024 2341   3b. FEMALE Any Age, or MALE 65+: How often do you have 4 or more drinks on one occassion? 0 Filed at: 06/20/2024 2341   Audit-C Score 0 Filed at: 06/20/2024 2341   NUBIA: How many times in the past year have you...    Used an illegal drug or used a prescription medication for non-medical reasons? Never Filed at: 06/20/2024 2341                  Medical Decision Making  Patient is 23-year-old female presenting for concerns of right pinky laceration  DDx: Right pinky laceration  Based on " patient rotation physical exam finds, primary concern is right pinky laceration.  Will provide patient with surgical glue and finger splint.  Return precautions given.  Patient understands and agrees with plan.  Ready for discharge.    Problems Addressed:  Laceration of right little finger without foreign body without damage to nail, initial encounter: acute illness or injury          Disposition  Final diagnoses:   Laceration of right little finger without foreign body without damage to nail, initial encounter     Time reflects when diagnosis was documented in both MDM as applicable and the Disposition within this note       Time User Action Codes Description Comment    6/21/2024 12:04 AM Avelino Shanks Add [S61.216A] Laceration of right little finger without foreign body without damage to nail, initial encounter           ED Disposition       ED Disposition   Discharge    Condition   Stable    Date/Time   Fri Jun 21, 2024 0004    Comment   Maria E Perez discharge to home/self care.                   Follow-up Information       Follow up With Specialties Details Why Contact Info Additional Information    [facility] Emergency Department Emergency Medicine Go to  If symptoms worsen [address] [address]  [phone] [facility] Emergency Department, [address], [place], [address]   [phone]    [facility] Emergency Department Emergency Medicine Go to  If symptoms worsen [address] [address]  [phone] [facility] Emergency Department, [address] [place] [address], [address]   [phone]            Discharge Medication List as of 6/21/2024 12:14 AM        CONTINUE these medications which have NOT CHANGED    Details   albuterol (PROVENTIL HFA,VENTOLIN HFA) 90 mcg/act inhaler Inhale 2 puffs every 6 (six) hours as needed for shortness of breath,  Starting Wed 4/26/2023, Normal      methylPREDNISolone 4 MG tablet therapy pack Use as directed on package, Normal           No discharge procedures on file.    PDMP Review       None             ED Provider  Attending physically available and evaluated Maria E Jacksonties. I managed the patient along with the ED Attending.    Electronically Signed by           Avelino Shanks MD  06/21/24 5109

## 2024-06-21 NOTE — ED ATTENDING ATTESTATION
I, Teddy Nixon MD, saw and evaluated the patient. I have discussed the patient with the resident and agree with the resident's findings, Plan of Care, and MDM as documented in the resident's note, except where noted. All available labs and Radiology studies were reviewed.  I was present for key portions of any procedure(s) performed by the resident and I was immediately available to provide assistance.    At this point I agree with the current assessment done in the Emergency Department.  I have conducted an independent evaluation of this patient a history and physical is as follows:    24 yo female presents to the ED complaining of a right fifth finger laceration. The patient accidentally cut herself on a sharp piece of glass while washing dishes this evening. Bleeding is controlled. No numbness or weakness in the finger. (+) FROM. Tetanus is UTD. No other injuries or complaints.    ROS: per resident physician note    Gen: NAD, AA&Ox3  HEENT: PERRL, EOMI  Neck: supple  CV: RRR  Lungs: CTA B/L  Abdomen: soft, NT/ND  Ext: no swelling or deformity  Neuro: 5/5 strength all extremities, sensation grossly intact  Skin: (+) small well approximated lacerations x 2 to right lateral pinky finger, no active bleeding, (+) FROM of the finger, sensation intact    ED Course  The patient is well appearing with stable vital signs. Finger lacerations irrigated copiously and repaired with skin adhesive at bedside. She tolerated the procedure well. Finger splint provided. Strict return precautions discussed.      Critical Care Time  Procedures

## 2024-06-25 ENCOUNTER — HOSPITAL ENCOUNTER (EMERGENCY)
Facility: HOSPITAL | Age: 24
Discharge: HOME | End: 2024-06-25
Attending: EMERGENCY MEDICINE
Payer: COMMERCIAL

## 2024-06-25 VITALS
OXYGEN SATURATION: 98 % | TEMPERATURE: 97.8 F | DIASTOLIC BLOOD PRESSURE: 86 MMHG | SYSTOLIC BLOOD PRESSURE: 140 MMHG | RESPIRATION RATE: 18 BRPM | HEART RATE: 97 BPM

## 2024-06-25 DIAGNOSIS — S61.217D LACERATION OF LEFT LITTLE FINGER WITHOUT FOREIGN BODY WITHOUT DAMAGE TO NAIL, SUBSEQUENT ENCOUNTER: Primary | ICD-10-CM

## 2024-06-25 PROCEDURE — 99283 EMERGENCY DEPT VISIT LOW MDM: CPT | Performed by: EMERGENCY MEDICINE

## 2024-06-25 RX ORDER — GINSENG 100 MG
1 CAPSULE ORAL ONCE
Status: COMPLETED | OUTPATIENT
Start: 2024-06-25 | End: 2024-06-25

## 2024-06-25 RX ADMIN — BACITRACIN 1 SMALL APPLICATION: 500 OINTMENT TOPICAL at 20:12

## 2024-06-25 NOTE — ED PROVIDER NOTES
History  Chief Complaint   Patient presents with    Finger Laceration     Pt was seen here the other day for R pinky laceration, was glued and sent home. Glue fell off today and pt concerned she may need stitches. Bleeding controlled      23 y.o F w/ recent presentation for laceration to R william that was glued on 06/20, presents to the ED today for reevaluation of the site because the glue came off and she was concerned it may not be healing correctly. She notes the glue came off earlier today and her  was concerned it looked red and may be reopening. She notes since the glue came off she had a very small amount of clear-orangish fluid come out but otherwise has not had bleeding or other issues. She denies pain, expanding erythema, stiffness, or other issues with the finger. No systemic complaints.     Prior to Admission Medications   Prescriptions Last Dose Informant Patient Reported? Taking?   albuterol (PROVENTIL HFA,VENTOLIN HFA) 90 mcg/act inhaler   No No   Sig: Inhale 2 puffs every 6 (six) hours as needed for shortness of breath   methylPREDNISolone 4 MG tablet therapy pack   No No   Sig: Use as directed on package      Facility-Administered Medications: None       Past Medical History:   Diagnosis Date    Exercise induced bronchospasm 3/6/2016    Albuterol 2 puffs q 4 prn    Wears glasses        Past Surgical History:   Procedure Laterality Date    APPENDECTOMY      WISDOM TOOTH EXTRACTION         Family History   Problem Relation Age of Onset    No Known Problems Mother     No Known Problems Father      I have reviewed and agree with the history as documented.    E-Cigarette/Vaping     E-Cigarette/Vaping Substances    Flavoring Yes      Social History     Tobacco Use    Smoking status: Never    Smokeless tobacco: Never   Substance Use Topics    Alcohol use: Not Currently    Drug use: Yes     Types: Marijuana        Review of Systems   All other systems reviewed and are negative.      Physical Exam  ED  Triage Vitals [06/25/24 1903]   Temperature Pulse Respirations Blood Pressure SpO2   97.8 °F (36.6 °C) 97 18 140/86 98 %      Temp Source Heart Rate Source Patient Position - Orthostatic VS BP Location FiO2 (%)   Temporal Monitor Sitting Left arm --      Pain Score       No Pain             Orthostatic Vital Signs  Vitals:    06/25/24 1903   BP: 140/86   Pulse: 97   Patient Position - Orthostatic VS: Sitting       Physical Exam  Vitals and nursing note reviewed.   Constitutional:       General: She is not in acute distress.     Appearance: She is well-developed.   HENT:      Head: Normocephalic and atraumatic.   Eyes:      Conjunctiva/sclera: Conjunctivae normal.   Cardiovascular:      Rate and Rhythm: Normal rate and regular rhythm.      Heart sounds: No murmur heard.  Pulmonary:      Effort: Pulmonary effort is normal. No respiratory distress.      Breath sounds: Normal breath sounds.   Abdominal:      Palpations: Abdomen is soft.      Tenderness: There is no abdominal tenderness.   Musculoskeletal:         General: No swelling.      Cervical back: Neck supple.   Skin:     General: Skin is warm and dry.      Capillary Refill: Capillary refill takes less than 2 seconds.      Comments: R pinky finger laceration repair is well healing with no fluid expressible, minimal erythema at edges, no tenderness to palpation. No restriction to ROM.   Neurological:      Mental Status: She is alert.   Psychiatric:         Mood and Affect: Mood normal.         ED Medications  Medications   bacitracin topical ointment 1 small application (1 small application Topical Given by Other 6/25/24 2012)       Diagnostic Studies  Results Reviewed       None                   No orders to display         Procedures  Procedures      ED Course                             SBIRT 20yo+      Flowsheet Row Most Recent Value   Initial Alcohol Screen: US AUDIT-C     1. How often do you have a drink containing alcohol? 0 Filed at: 06/25/2024 1905   2.  How many drinks containing alcohol do you have on a typical day you are drinking?  0 Filed at: 06/25/2024 1905   3b. FEMALE Any Age, or MALE 65+: How often do you have 4 or more drinks on one occassion? 0 Filed at: 06/25/2024 1905   Audit-C Score 0 Filed at: 06/25/2024 1905   NUBIA: How many times in the past year have you...    Used an illegal drug or used a prescription medication for non-medical reasons? Never Filed at: 06/25/2024 1905                  Medical Decision Making  23F presenting with a well healing laceration to pinky finger. The site is closed, unable to spread edges or express fluid. Exam otherwise reassuring. No need for further intervention. Recommend continuing to keep the area clean and covered if in dirty spaces. Otherwise provided with return precautions for developing infection or dehiscence.     Risk  OTC drugs.          Disposition  Final diagnoses:   Laceration of left little finger without foreign body without damage to nail, subsequent encounter     Time reflects when diagnosis was documented in both MDM as applicable and the Disposition within this note       Time User Action Codes Description Comment    6/25/2024  7:57 PM Cheng Umaña Add [S61.217D] Laceration of left little finger without foreign body without damage to nail, subsequent encounter           ED Disposition       ED Disposition   Discharge    Condition   Stable    Date/Time   Tue Jun 25, 2024 1956    Comment   Maria E Perez discharge to home/self care.                   Follow-up Information    None         Discharge Medication List as of 6/25/2024  7:58 PM        CONTINUE these medications which have NOT CHANGED    Details   albuterol (PROVENTIL HFA,VENTOLIN HFA) 90 mcg/act inhaler Inhale 2 puffs every 6 (six) hours as needed for shortness of breath, Starting Wed 4/26/2023, Normal      methylPREDNISolone 4 MG tablet therapy pack Use as directed on package, Normal           No discharge procedures on file.    PDMP  Review       None             ED Provider  Attending physically available and evaluated Donyelle Petties. I managed the patient along with the ED Attending.    Electronically Signed by           Cheng Umaña MD  06/26/24 8761

## 2024-06-25 NOTE — DISCHARGE INSTRUCTIONS
The finger is well appearing, please continue to wash with soap and water to keep the area clean.     Please monitor for spreading redness into the hand, significant increase in pain, difficulty moving finger, or other concerning symptoms and return to the Ed if you develop these concerns.

## 2024-06-26 NOTE — ED ATTENDING ATTESTATION
6/25/2024  I, Dian Mcneil MD, saw and evaluated the patient. I have discussed the patient with the resident/non-physician practitioner and agree with the resident's/non-physician practitioner's findings, Plan of Care, and MDM as documented in the resident's/non-physician practitioner's note, except where noted. All available labs and Radiology studies were reviewed.  I was present for key portions of any procedure(s) performed by the resident/non-physician practitioner and I was immediately available to provide assistance.       At this point I agree with the current assessment done in the Emergency Department.  I have conducted an independent evaluation of this patient a history and physical is as follows:      OA: 24 y/o f with wound check to right 5th digit. Pt had sustained a laceration from a piece of broken glass. Had the wound irrigated and glued on 6/20. Today was at the [facility] and noted the glue was coming off. She also was concerned that there was a small amount of fluid that came out of it at the time as well. Otherwise healing well, no drainage/bleeding, redness/swelling or pain with movement. No fevers/chills. Came in for re-evaluation. Denies risk factors for immunocompromise. PE, NAD, VSS, NC/AT, MMM, + radial pulses, + healing small, elliptical laceration to the R fifth digit, no dehiscence, no ttp, no drainage/bleeding, no swelling, FROM, no ttp over joints, capillary refill < 2 sec, AAO. A/p healing wound, no s/s of infection at this time. I offered the pt a steristrip but declined. Discussed wound precautions and how to keep area clean and dry. Return precautions given.   ED Course         Critical Care Time  Procedures

## 2024-07-12 ENCOUNTER — VBI (OUTPATIENT)
Dept: ADMINISTRATIVE | Facility: OTHER | Age: 24
End: 2024-07-12

## 2024-07-12 NOTE — TELEPHONE ENCOUNTER
07/12/24 7:52 AM     Chart reviewed for Pap Smear (HPV) aka Cervical Cancer Screening ; nothing is submitted to the patient's insurance at this time.     Glenis Song   [facility]

## 2024-12-04 DIAGNOSIS — Z00.6 ENCOUNTER FOR EXAMINATION FOR NORMAL COMPARISON OR CONTROL IN CLINICAL RESEARCH PROGRAM: ICD-10-CM

## 2024-12-05 ENCOUNTER — APPOINTMENT (OUTPATIENT)
Dept: LAB | Facility: CLINIC | Age: 24
End: 2024-12-05

## 2024-12-05 DIAGNOSIS — Z00.6 ENCOUNTER FOR EXAMINATION FOR NORMAL COMPARISON OR CONTROL IN CLINICAL RESEARCH PROGRAM: ICD-10-CM

## 2024-12-05 PROCEDURE — 36415 COLL VENOUS BLD VENIPUNCTURE: CPT

## 2024-12-17 LAB
APOB+LDLR+PCSK9 GENE MUT ANL BLD/T: NOT DETECTED
BRCA1+BRCA2 DEL+DUP + FULL MUT ANL BLD/T: NOT DETECTED
MLH1+MSH2+MSH6+PMS2 GN DEL+DUP+FUL M: NOT DETECTED